# Patient Record
Sex: MALE | URBAN - METROPOLITAN AREA
[De-identification: names, ages, dates, MRNs, and addresses within clinical notes are randomized per-mention and may not be internally consistent; named-entity substitution may affect disease eponyms.]

---

## 2020-03-03 ENCOUNTER — OFFICE VISIT (OUTPATIENT)
Dept: URBAN - METROPOLITAN AREA CLINIC 48 | Facility: CLINIC | Age: 75
End: 2020-03-03
Payer: MEDICARE

## 2020-03-03 DIAGNOSIS — H31.002 CHORIORETINAL SCAR OF LEFT EYE: ICD-10-CM

## 2020-03-03 PROCEDURE — 92014 COMPRE OPH EXAM EST PT 1/>: CPT | Performed by: OPHTHALMOLOGY

## 2020-03-03 PROCEDURE — 92004 COMPRE OPH EXAM NEW PT 1/>: CPT | Performed by: OPHTHALMOLOGY

## 2020-03-03 ASSESSMENT — VISUAL ACUITY: OS: 20/70

## 2020-03-03 ASSESSMENT — INTRAOCULAR PRESSURE
OD: 17
OS: 22

## 2020-03-03 ASSESSMENT — KERATOMETRY
OS: 39.38
OD: 41.00

## 2020-03-03 NOTE — IMPRESSION/PLAN
Impression: Age-related nuclear cataract, bilateral: H25.13. Plan: The patient has a visually significant cataract in left eye, after discussion with the patient and careful examination it has been determined that a cataract in left eye is accounting for a significant amount of the patient's visual symptoms. Cataract surgery and the associated risks, benefits, alternatives, expectations, and recovery were discussed in detail with the patient. All questions were answered. The patient understands that there may be some limitation in visual potential given any pre-existing ocular disease. The patient desires cataract surgery in left eye. Patient desires standard lens for distance target. Patient elects to use (ERX standard drops ). All potential side effects and benefits of medication discussed . Patient is to start drop(s) to operative eye one day prior to surgery. Schedule cataract surgery w/ guarded prognosis in left eye only.  RL 2

## 2020-06-29 ENCOUNTER — TESTING ONLY (OUTPATIENT)
Dept: URBAN - METROPOLITAN AREA CLINIC 48 | Facility: CLINIC | Age: 75
End: 2020-06-29
Payer: MEDICARE

## 2020-06-29 DIAGNOSIS — H25.13 AGE-RELATED NUCLEAR CATARACT, BILATERAL: Primary | ICD-10-CM

## 2020-06-29 PROCEDURE — 92136 OPHTHALMIC BIOMETRY: CPT | Performed by: OPHTHALMOLOGY

## 2020-06-29 ASSESSMENT — PACHYMETRY
OS: 2.49
OS: 24.05

## 2020-06-30 RX ORDER — KETOROLAC TROMETHAMINE 5 MG/ML
0.5 % SOLUTION OPHTHALMIC
Qty: 5 | Refills: 0 | Status: INACTIVE
Start: 2020-06-30 | End: 2020-09-23

## 2020-06-30 RX ORDER — PREDNISOLONE ACETATE 10 MG/ML
1 % SUSPENSION/ DROPS OPHTHALMIC
Qty: 5 | Refills: 3 | Status: INACTIVE
Start: 2020-06-30 | End: 2020-10-13

## 2020-06-30 RX ORDER — OFLOXACIN 3 MG/ML
0.3 % SOLUTION/ DROPS OPHTHALMIC
Qty: 5 | Refills: 0 | Status: INACTIVE
Start: 2020-06-30 | End: 2020-09-23

## 2020-08-20 ENCOUNTER — OFFICE VISIT (OUTPATIENT)
Dept: URBAN - METROPOLITAN AREA CLINIC 48 | Facility: CLINIC | Age: 75
End: 2020-08-20
Payer: MEDICARE

## 2020-08-20 PROCEDURE — 92014 COMPRE OPH EXAM EST PT 1/>: CPT | Performed by: OPHTHALMOLOGY

## 2020-08-20 ASSESSMENT — INTRAOCULAR PRESSURE
OS: 21
OD: 16

## 2020-08-20 ASSESSMENT — KERATOMETRY
OD: 41.50
OS: 39.13

## 2020-08-20 NOTE — IMPRESSION/PLAN
Impression: Age-related nuclear cataract, bilateral: H25.13. Plan: The patient has a visually significant cataract in left eye, after discussion with the patient and careful examination it has been determined that a cataract in left eye is accounting for a significant amount of the patient's visual symptoms. Cataract surgery and the associated risks, benefits, alternatives, expectations, and recovery were discussed in detail with the patient. All questions were answered. The patient understands that there may be some limitation in visual potential given any pre-existing ocular disease. The patient desires cataract surgery in left eye. Patient desires standard lens for distance target. Patient elects to use (ERX standard drops ). All potential side effects and benefits of medication discussed . Patient is to start drop(s) to operative eye one day prior to surgery. Schedule cataract surgery with guarded prognosis in left eye only.  RL 2

## 2020-09-22 ENCOUNTER — SURGERY (OUTPATIENT)
Dept: URBAN - METROPOLITAN AREA SURGERY 26 | Facility: SURGERY | Age: 75
End: 2020-09-22
Payer: MEDICARE

## 2020-09-22 PROCEDURE — 66984 XCAPSL CTRC RMVL W/O ECP: CPT | Performed by: OPHTHALMOLOGY

## 2020-09-23 ENCOUNTER — POST-OPERATIVE VISIT (OUTPATIENT)
Dept: URBAN - METROPOLITAN AREA CLINIC 48 | Facility: CLINIC | Age: 75
End: 2020-09-23
Payer: MEDICARE

## 2020-09-23 PROCEDURE — 99024 POSTOP FOLLOW-UP VISIT: CPT | Performed by: OPTOMETRIST

## 2020-09-23 RX ORDER — OFLOXACIN 3 MG/ML
0.3 % SOLUTION/ DROPS OPHTHALMIC
Qty: 1 | Refills: 0 | Status: INACTIVE
Start: 2020-09-23 | End: 2020-10-13

## 2020-09-23 RX ORDER — KETOROLAC TROMETHAMINE 5 MG/ML
0.5 % SOLUTION OPHTHALMIC
Qty: 1 | Refills: 0 | Status: INACTIVE
Start: 2020-09-23 | End: 2020-09-29

## 2020-09-23 ASSESSMENT — INTRAOCULAR PRESSURE: OS: 19

## 2020-09-23 NOTE — IMPRESSION/PLAN
Impression: S/P CE/Standard IOL/SN60WF 23.00 OS - 1 Day. Encounter for surgical aftercare following surgery on a sense organ  Z48.810. Post operative instructions reviewed. Plan: RTC 1 week PO2 --Advised patient to use artificial tears for comfort. 

Start Pred 6x a day OS, Ket QID OS, Oflox QID OS

## 2020-09-29 ENCOUNTER — POST-OPERATIVE VISIT (OUTPATIENT)
Dept: URBAN - METROPOLITAN AREA CLINIC 48 | Facility: CLINIC | Age: 75
End: 2020-09-29
Payer: MEDICARE

## 2020-09-29 PROCEDURE — 99024 POSTOP FOLLOW-UP VISIT: CPT | Performed by: OPTOMETRIST

## 2020-09-29 RX ORDER — KETOROLAC TROMETHAMINE 5 MG/ML
0.5 % SOLUTION OPHTHALMIC
Qty: 1 | Refills: 0 | Status: INACTIVE
Start: 2020-09-29 | End: 2020-10-13

## 2020-09-29 ASSESSMENT — INTRAOCULAR PRESSURE: OS: 17

## 2020-09-29 NOTE — IMPRESSION/PLAN
Impression: S/P CE/Standard IOL/SN60WF 23.00 OS - 7 Days. Encounter for surgical aftercare following surgery on a sense organ  Z48.810. Post operative instructions reviewed. Disc off restrictions. RD precautions discussed. Plan: RTC 2 week PO3 --Advised patient to use artificial tears for comfort. OS:
D/C Ofloxacin Continue Pred TID OS, Ket TID OS

## 2020-10-13 ENCOUNTER — POST-OPERATIVE VISIT (OUTPATIENT)
Dept: URBAN - METROPOLITAN AREA CLINIC 48 | Facility: CLINIC | Age: 75
End: 2020-10-13
Payer: MEDICARE

## 2020-10-13 DIAGNOSIS — Z48.810 ENCOUNTER FOR SURGICAL AFTERCARE FOLLOWING SURGERY ON A SENSE ORGAN: Primary | ICD-10-CM

## 2020-10-13 PROCEDURE — 99024 POSTOP FOLLOW-UP VISIT: CPT | Performed by: OPTOMETRIST

## 2020-10-13 RX ORDER — BRIMONIDINE TARTRATE 2 MG/ML
0.2 % SOLUTION/ DROPS OPHTHALMIC
Qty: 1 | Refills: 0 | Status: ACTIVE
Start: 2020-10-13

## 2020-10-13 RX ORDER — PREDNISOLONE ACETATE 10 MG/ML
1 % SUSPENSION/ DROPS OPHTHALMIC
Qty: 1 | Refills: 0 | Status: ACTIVE
Start: 2020-10-13

## 2020-10-13 ASSESSMENT — INTRAOCULAR PRESSURE
OD: 18
OS: 24
OS: 30

## 2020-10-13 NOTE — IMPRESSION/PLAN
Impression: S/P CE/Standard IOL/SN60WF 23.00 OS - 21 Days. Encounter for surgical aftercare following surgery on a sense organ  Z48.810. Post operative instructions reviewed - Plan: RTC Friday with RONNY, OCT Mac for ARMD, pterygium OU (not post-op appt) D/C Ket Continue Pred QD OS Start Brim TID OS

## 2020-10-20 ENCOUNTER — OFFICE VISIT (OUTPATIENT)
Dept: URBAN - METROPOLITAN AREA CLINIC 48 | Facility: CLINIC | Age: 75
End: 2020-10-20
Payer: MEDICARE

## 2020-10-20 DIAGNOSIS — H35.3213 EXUDATIVE AGE-REL MCLR DEGN, RIGHT EYE, WITH INACTIVE SCAR: Primary | ICD-10-CM

## 2020-10-20 DIAGNOSIS — H35.3122 NONEXUDATIVE AGE-RELATED MACULAR DEGENERATION OF LEFT EYE, INTERMEDIATE DRY STAGE: ICD-10-CM

## 2020-10-20 PROCEDURE — 92014 COMPRE OPH EXAM EST PT 1/>: CPT | Performed by: OPHTHALMOLOGY

## 2020-10-20 ASSESSMENT — INTRAOCULAR PRESSURE
OS: 19
OD: 18

## 2020-10-20 NOTE — IMPRESSION/PLAN
Impression: Nonexudative age-related macular degeneration of left eye, intermediate dry stage: H35.3122. Plan: Macular degeneration, dry type with stable vision. Will continue to monitor vision and the patient has been instructed to call with any vision changes. NATALIYA and AREDS2 discussed with patient.  

rtc 4 month DE OCT/MAC

## 2020-10-20 NOTE — IMPRESSION/PLAN
Impression: Age-related nuclear cataract, right eye: H25.11. Plan: poor vision prognosis if patient was to have catarct surgery, was monitored by Dr. Yovanny Coy. 


continue to monitor

## 2020-10-20 NOTE — IMPRESSION/PLAN
Impression: Exudative age-rel mclr degn, right eye, with inactive scar: H35.6784. Plan: Macular degeneration, dry type with stable vision. Will continue to monitor vision and the patient has been instructed to call with any vision changes. Needs to be added on if patient notices changes on his grid. Continue AREDS 2 eye vitamins and Ams;er grid screening. 


RTC 4 months DE/OCT mac ( long exam)

## 2021-02-02 ENCOUNTER — OFFICE VISIT (OUTPATIENT)
Dept: URBAN - METROPOLITAN AREA CLINIC 48 | Facility: CLINIC | Age: 76
End: 2021-02-02
Payer: MEDICARE

## 2021-02-02 DIAGNOSIS — H35.3231 EXUDATIVE AGE-RELATED MACULAR DEGENERATION, BILATERAL, WITH ACTIVE CHOROIDAL NEOVASCULARIZATION: Primary | ICD-10-CM

## 2021-02-02 PROCEDURE — 92134 CPTRZ OPH DX IMG PST SGM RTA: CPT | Performed by: OPHTHALMOLOGY

## 2021-02-02 PROCEDURE — 92014 COMPRE OPH EXAM EST PT 1/>: CPT | Performed by: OPHTHALMOLOGY

## 2021-02-02 ASSESSMENT — INTRAOCULAR PRESSURE
OD: 20
OS: 14

## 2021-02-02 NOTE — IMPRESSION/PLAN
Impression: Exudative age-related macular degeneration, bilateral, with active choroidal neovascularization: H35.2028. Plan: Patient has new SRF OCT  OS. Recommend injection OU Schedule Avastin OU x 1 then4 weeks DE/OCT Macey Fierro #1 tomorrow morning, Please get auth

## 2021-02-24 ENCOUNTER — OFFICE VISIT (OUTPATIENT)
Dept: URBAN - METROPOLITAN AREA CLINIC 48 | Facility: CLINIC | Age: 76
End: 2021-02-24
Payer: MEDICARE

## 2021-02-24 DIAGNOSIS — H40.031 ANATOMICAL NARROW ANGLE, RIGHT EYE: ICD-10-CM

## 2021-02-24 DIAGNOSIS — H25.11 AGE-RELATED NUCLEAR CATARACT, RIGHT EYE: ICD-10-CM

## 2021-02-24 PROCEDURE — 99215 OFFICE O/P EST HI 40 MIN: CPT | Performed by: OPHTHALMOLOGY

## 2021-02-24 PROCEDURE — 92134 CPTRZ OPH DX IMG PST SGM RTA: CPT | Performed by: OPHTHALMOLOGY

## 2021-02-24 ASSESSMENT — INTRAOCULAR PRESSURE
OD: 16
OS: 18

## 2021-02-24 NOTE — IMPRESSION/PLAN
Impression: Exudative age-related macular degeneration, bilateral, with active choroidal neovascularization: H35.3231. Plan: Patient has been offered injections in the past.  He has a massive subretinal heme and was offered surgery by Dr. Kristin Denton who came and saw the patient. Patient is hesitant to have this done just as he has been with injections. He will call us to schedule. If he calls he is to be added on for PPV OS with Dr. Kristin Denton with subretinal heme drainage. Come talk to Dr. Sedonia Castleman RTC 1 week in AM with Dr. Sedonia Castleman for DE/OCT macula if he is not opting for surgery OS.

## 2021-02-24 NOTE — IMPRESSION/PLAN
Impression: Exudative age-related macular degeneration, bilateral, with active choroidal neovascularization: H35.3231. Discussed the options of injecting right eye with poor prognosis and injecting OS for recent worsening of exudation. Plan: Avastin OU x 1 with a 3 week follow up with DE and OCT macula

## 2021-02-24 NOTE — IMPRESSION/PLAN
Impression: Anatomical narrow angle, right eye: H40.031.  Plan: Patient experiencing right brow ache

## 2021-02-24 NOTE — IMPRESSION/PLAN
Impression: Age-related nuclear cataract, right eye: H25.11. Right. Plan: Consider Phaco or LPI on the right Dr. Leonora Srivastava follow up for cataract evaluation without Dilation due to narrow angle 1-2 weeks.

## 2022-12-14 ENCOUNTER — OFFICE VISIT (OUTPATIENT)
Dept: URBAN - METROPOLITAN AREA CLINIC 48 | Facility: CLINIC | Age: 77
End: 2022-12-14
Payer: MEDICARE

## 2022-12-14 DIAGNOSIS — H40.031 ANATOMICAL NARROW ANGLE, RIGHT EYE: ICD-10-CM

## 2022-12-14 DIAGNOSIS — H25.11 AGE-RELATED NUCLEAR CATARACT, RIGHT EYE: ICD-10-CM

## 2022-12-14 DIAGNOSIS — H35.3231 EXUDATIVE AGE-RELATED MACULAR DEGENERATION, BILATERAL, WITH ACTIVE CHOROIDAL NEOVASCULARIZATION: Primary | ICD-10-CM

## 2022-12-14 DIAGNOSIS — B00.52 HERPESVIRAL KERATITIS: ICD-10-CM

## 2022-12-14 PROCEDURE — 99213 OFFICE O/P EST LOW 20 MIN: CPT | Performed by: OPHTHALMOLOGY

## 2022-12-14 RX ORDER — GANCICLOVIR 1.5 MG/G
0.15 % GEL OPHTHALMIC
Qty: 5 | Refills: 1 | Status: ACTIVE
Start: 2022-12-14

## 2022-12-14 RX ORDER — OFLOXACIN 3 MG/ML
0.3 % SOLUTION/ DROPS OPHTHALMIC
Qty: 5 | Refills: 1 | Status: ACTIVE
Start: 2022-12-14

## 2022-12-14 ASSESSMENT — INTRAOCULAR PRESSURE
OD: 17
OS: 16

## 2022-12-14 NOTE — IMPRESSION/PLAN
Impression: Herpesviral keratitis: B00.52. Plan: Moxiofloxacin 5 times a day (then start ofloxacin) Refresh gel drops 5 times a day Zurgan gel 5 times a day Valayclovir 500 mg TID PO

at least 10 minutes apart RTC f/u with Dr Lindsay Massey on Friday

## 2022-12-14 NOTE — IMPRESSION/PLAN
Impression: Exudative age-related macular degeneration, bilateral, with active choroidal neovascularization: H35.3231. Plan: Can Reassess with Dr King Citizen Cataract evaluation next visit

## 2022-12-14 NOTE — IMPRESSION/PLAN
Impression: Exudative age-related macular degeneration, bilateral, with active choroidal neovascularization: H35.3231. Plan: Patient has refused treatment in the past with Dr Zaid Parikh and Dr America Banda. At this time patient is refusing treatment of injections or surgery, but daughters are willing to speak with specialist do convince patient otherwise Please schedule appointment with Dr Farrah Valdes in January

## 2022-12-19 ENCOUNTER — OFFICE VISIT (OUTPATIENT)
Dept: URBAN - METROPOLITAN AREA CLINIC 48 | Facility: CLINIC | Age: 77
End: 2022-12-19
Payer: MEDICARE

## 2022-12-19 DIAGNOSIS — B00.52 HERPESVIRAL KERATITIS: Primary | ICD-10-CM

## 2022-12-19 PROCEDURE — 99213 OFFICE O/P EST LOW 20 MIN: CPT | Performed by: STUDENT IN AN ORGANIZED HEALTH CARE EDUCATION/TRAINING PROGRAM

## 2022-12-19 ASSESSMENT — INTRAOCULAR PRESSURE
OD: 13
OS: 10

## 2022-12-19 NOTE — IMPRESSION/PLAN
Impression: Herpesviral keratitis: B00.52. Plan: Vision continues improving. Small dendritic lesion sup, epithelial heaping inf. 4 x 5 mm. OD Continue : Mofloxacin 5QD Continue Refresh gel gtts Q5D Continue Zurgan gel 5QD 

PO Continue: Valayclovir 1 gram TID 


RTC 1 wk. for follow up with Dr. Disha Castañeda

## 2022-12-29 ENCOUNTER — OFFICE VISIT (OUTPATIENT)
Dept: URBAN - METROPOLITAN AREA CLINIC 48 | Facility: CLINIC | Age: 77
End: 2022-12-29
Payer: MEDICARE

## 2022-12-29 DIAGNOSIS — B00.52 HERPESVIRAL KERATITIS: Primary | ICD-10-CM

## 2022-12-29 DIAGNOSIS — H35.3231 EXUDATIVE AGE-RELATED MACULAR DEGENERATION, BILATERAL, WITH ACTIVE CHOROIDAL NEOVASCULARIZATION: ICD-10-CM

## 2022-12-29 PROCEDURE — 99213 OFFICE O/P EST LOW 20 MIN: CPT | Performed by: OPHTHALMOLOGY

## 2022-12-29 ASSESSMENT — INTRAOCULAR PRESSURE
OD: 20
OS: 20

## 2022-12-29 NOTE — IMPRESSION/PLAN
Impression: Herpesviral keratitis: B00.52. Plan: Improvement noted OD. Reduce: Moxiloxacin BID, Zirgan BID, Valacyclovir 1gm PO BID Continue: AFT as often as possible RTC 01/10 Follow up, OCT Angle Scan and evaluate for LPI in OD.

## 2022-12-29 NOTE — IMPRESSION/PLAN
Impression: Exudative age-related macular degeneration, bilateral, with active choroidal neovascularization: H35.3231. Plan: Has long standing WET AMD that was not treated at patients request. Will evaluate after LPI.

## 2023-02-08 ENCOUNTER — OFFICE VISIT (OUTPATIENT)
Facility: LOCATION | Age: 78
End: 2023-02-08
Payer: MEDICARE

## 2023-02-08 DIAGNOSIS — H43.12 VITREOUS HEMORRHAGE, LEFT EYE: ICD-10-CM

## 2023-02-08 DIAGNOSIS — H25.11 AGE-RELATED NUCLEAR CATARACT, RIGHT EYE: ICD-10-CM

## 2023-02-08 DIAGNOSIS — H35.3233 BILATERAL EXUDATIVE AGE-RELATED MACULAR DEGENERATION W/ INACTIVE SCAR: Primary | ICD-10-CM

## 2023-02-08 DIAGNOSIS — Z96.1 PRESENCE OF INTRAOCULAR LENS: ICD-10-CM

## 2023-02-08 PROCEDURE — 99204 OFFICE O/P NEW MOD 45 MIN: CPT | Performed by: OPHTHALMOLOGY

## 2023-02-08 PROCEDURE — 92134 CPTRZ OPH DX IMG PST SGM RTA: CPT | Performed by: OPHTHALMOLOGY

## 2023-02-08 ASSESSMENT — INTRAOCULAR PRESSURE
OS: 15
OD: 13

## 2023-02-08 NOTE — IMPRESSION/PLAN
Impression: Vitreous hemorrhage, left eye: H43.12. Left. Plan: Patient with vitreous hemorrhage secondary to wet AMD OS. Hemorrhage has been persistent for >several months. Reviewed treatment options with patient including observation and surgery. The patient wishes to be observed at this time. Discussed upright position and elevation of his/her head while sleeping. The patient has been instructed to call if condition or symptoms worsen.

## 2023-02-08 NOTE — IMPRESSION/PLAN
Impression: Bilateral exudative age-related macular degeneration w/ inactive scar: M88.8511. Bilateral.

OCT: 
OD: scarring OS: scarring Plan: The patient has a disciform scar. I recommend observation as there is currently no effective treatment for subretinal scarring from a choroidal neovascular membrane (CNVM). The patient understands that further treatment would only be indicated to prevent enlargement of the central scotoma. I recommend the patient consider low vision aids, and we discussed the importance of yearly dilated eye exams. 

RTC PRN- Follow up with Dr Hanna Alexander

## 2023-02-08 NOTE — IMPRESSION/PLAN
Impression: Age-related nuclear cataract, right eye: H25.11. Plan: Consider cat sx if indicated by Dr Tyler Ramos. Doubt there would be much improvement in vision with surgery.